# Patient Record
Sex: FEMALE | Race: WHITE | NOT HISPANIC OR LATINO | Employment: OTHER | URBAN - METROPOLITAN AREA
[De-identification: names, ages, dates, MRNs, and addresses within clinical notes are randomized per-mention and may not be internally consistent; named-entity substitution may affect disease eponyms.]

---

## 2023-11-03 ENCOUNTER — CONSULT (OUTPATIENT)
Dept: PULMONOLOGY | Facility: MEDICAL CENTER | Age: 88
End: 2023-11-03
Payer: MEDICARE

## 2023-11-03 VITALS
OXYGEN SATURATION: 97 % | SYSTOLIC BLOOD PRESSURE: 122 MMHG | WEIGHT: 179 LBS | BODY MASS INDEX: 30.56 KG/M2 | DIASTOLIC BLOOD PRESSURE: 70 MMHG | RESPIRATION RATE: 16 BRPM | HEIGHT: 64 IN | TEMPERATURE: 97.9 F | HEART RATE: 69 BPM

## 2023-11-03 DIAGNOSIS — J45.20 MILD INTERMITTENT ASTHMA WITHOUT COMPLICATION: Primary | ICD-10-CM

## 2023-11-03 DIAGNOSIS — M54.81 OCCIPITAL NEURALGIA OF LEFT SIDE: ICD-10-CM

## 2023-11-03 DIAGNOSIS — I10 PRIMARY HYPERTENSION: ICD-10-CM

## 2023-11-03 LAB
DME PARACHUTE DELIVERY DATE ACTUAL: NORMAL
DME PARACHUTE DELIVERY DATE REQUESTED: NORMAL
DME PARACHUTE ITEM DESCRIPTION: NORMAL
DME PARACHUTE ORDER STATUS: NORMAL
DME PARACHUTE SUPPLIER NAME: NORMAL
DME PARACHUTE SUPPLIER PHONE: NORMAL

## 2023-11-03 PROCEDURE — 99204 OFFICE O/P NEW MOD 45 MIN: CPT | Performed by: INTERNAL MEDICINE

## 2023-11-03 RX ORDER — RAMIPRIL 10 MG/1
CAPSULE ORAL
COMMUNITY

## 2023-11-03 RX ORDER — EZETIMIBE 10 MG/1
TABLET ORAL
COMMUNITY
Start: 2015-03-25

## 2023-11-03 RX ORDER — PANTOPRAZOLE SODIUM 40 MG/1
TABLET, DELAYED RELEASE ORAL
COMMUNITY

## 2023-11-03 RX ORDER — BETAMETHASONE DIPROPIONATE 0.5 MG/G
CREAM TOPICAL
COMMUNITY
Start: 2015-03-25

## 2023-11-03 RX ORDER — CEFDINIR 300 MG/1
CAPSULE ORAL
COMMUNITY
End: 2023-11-04

## 2023-11-03 RX ORDER — MULTIVIT-MIN/IRON/FOLIC ACID/K 18-600-40
CAPSULE ORAL
COMMUNITY

## 2023-11-03 RX ORDER — NABUMETONE 500 MG/1
TABLET, FILM COATED ORAL
COMMUNITY
Start: 2014-10-29

## 2023-11-03 RX ORDER — PREDNISONE 20 MG/1
TABLET ORAL
COMMUNITY

## 2023-11-03 RX ORDER — CYCLOSPORINE 0.5 MG/ML
EMULSION OPHTHALMIC
COMMUNITY
Start: 2014-11-20

## 2023-11-03 RX ORDER — ASPIRIN 81 MG/1
TABLET ORAL
COMMUNITY

## 2023-11-03 RX ORDER — MOMETASONE FUROATE 1 MG/G
CREAM TOPICAL DAILY
COMMUNITY

## 2023-11-03 RX ORDER — FLUTICASONE FUROATE AND VILANTEROL 100; 25 UG/1; UG/1
1 POWDER RESPIRATORY (INHALATION) DAILY
COMMUNITY

## 2023-11-03 RX ORDER — CYANOCOBALAMIN (VITAMIN B-12) 500 MCG
LOZENGE ORAL
COMMUNITY

## 2023-11-03 RX ORDER — CEFUROXIME AXETIL 250 MG/1
TABLET ORAL
COMMUNITY
End: 2023-11-04

## 2023-11-03 RX ORDER — ERGOCALCIFEROL (VITAMIN D2) 10 MCG
TABLET ORAL
COMMUNITY

## 2023-11-03 RX ORDER — FLUTICASONE PROPIONATE 50 MCG
SPRAY, SUSPENSION (ML) NASAL
COMMUNITY

## 2023-11-03 RX ORDER — METOPROLOL SUCCINATE 50 MG/1
TABLET, EXTENDED RELEASE ORAL
COMMUNITY
Start: 2008-05-15

## 2023-11-03 RX ORDER — LEVOTHYROXINE SODIUM 0.05 MG/1
TABLET ORAL
COMMUNITY

## 2023-11-03 NOTE — PATIENT INSTRUCTIONS
Start Breo 100 mcg 1 puff daily and rinse mouth with water after using    When you start having wheezing or shortness of breath or chest congestion can use nebulizer with 1 vial of albuterol-ipratropium 4 times a day as needed    Use BREO 100 mcg  - 1 puff daily for 1 month and see if it helps. If this helps you can stay on this medication.   Otherwise can try stopping it and only use it when you start to feel sick with your bronchitis    If you like the nebulizer and medication call me and I will send prescription for the medication for nebulizer to your pharmacy    Back telephone number   [phone]

## 2023-11-03 NOTE — PROGRESS NOTES
Assessment/Plan:       Problem List Items Addressed This Visit          Respiratory    Mild intermittent asthma without complication - Primary     Boni Sexton has history of asthma diagnosed about 10 years or more ago. She has not been on any maintenance steroid inhaler but has used albuterol inhaler. She does not have a nebulizer. She did have a faint expiratory wheeze in her left lung base. Spirometry showed evidence of mild airflow obstruction and there is mild restriction which could be due to air trapping. Give her a trial of Trelegy 100 mcg 1 puff daily to see if this helps her breathing. I did give her nebulizer treatment with DuoNeb and she starts some wheeze at the left base. I did order her a nebulizer and we able to dispense this from [employer] today. I did give her several vials of albuterol-ipratropium she can use up to 4 times a day as needed and her nebulizer. She is to use if she has any wheezing or shortness of breath. So she may want to use nebulizer when she gets her bout of bronchitis that she usually gets once a year    She does have some wheeze and some shortness of breath now. Also has some mild airflow obstruction. She has Breo 100 mcg 3 inhalers at home but has not been using. I did tell her to try using it for 1 month and then she can just save it for when she starts having difficulty breathing. We did contact [facility] and pulmonary department to get records from their second review prior spirometry that she may have had done before. Recent chest x-ray was done elsewhere October 14 reported only minimal linear scarring left lower lobe otherwise chest x-ray was unremarkable. Relevant Medications    Fluticasone Furoate-Vilanterol 100-25 mcg/actuation inhaler    Other Relevant Orders    Nebulizer       Cardiovascular and Mediastinum    Primary hypertension     Boni Sexton is more ramipril 10 mg daily and metoprolol 50 mg daily for hypertension.   Blood pressure today is normal.         Relevant Medications    metoprolol succinate (TOPROL-XL) 50 mg 24 hr tablet    ramipril (ALTACE) 10 MG capsule       Nervous and Auditory    Occipital neuralgia of left side     She does get frequent pain in left side of her neck and up to the occipital region of her skull. She does like to zohra frequently and neurologist are thought that this condition is related to her crocheting. Relevant Medications    nabumetone (RELAFEN) 500 mg tablet    metoprolol succinate (TOPROL-XL) 50 mg 24 hr tablet    aspirin (Aspirin 81) 81 mg EC tablet         Plan for follow up:    Return in about 2 months (around 1/3/2024). All questions are answered to the patient's satisfaction and understanding. She verbalizes understanding. She is encouraged to call with any further questions or concerns. Portions of the record may have been created with voice recognition software. Occasional wrong word or "sound a like" substitutions may have occurred due to the inherent limitations of voice recognition software. Read the chart carefully and recognize, using context, where substitutions have occurred. a    Electronically Signed by Solo Clements DO    ______________________________________________________________________    Chief Complaint: No chief complaint on file. Patient ID: Lucy Vang is a 82 y.o. y.o. female has history of asthma and hypertension    11/3/2023  Patient presents today for initial visit for asthma    HPI    She has some chronic exertional shortness of breath such as walking up a flight of stairs or if she carries some packages up some stairs. Not having any chronic cough. States she usually gets bronchitis once a year. Last time she was sick was in  of this year. Usually she gets her bouts of bronchitis in January. She used to follow with pulmonologist Dr. Painting Friend who has since . She is being treated for mild intermittent asthma.   She has used Breo 100 mcg 1 puff daily in past and this seemed to benefit her. She states she does have a 3-month supply of this which was prescribed to her but she has not been using. he does have history of hypertension, hypothyroidism, mitral valve prolapse and GERD. 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 No history of coronary artery disease. She does follow with cardiologist Dr. Joyce Damico. She is not having any chest pain. She denies any significant seasonal allergies. She states she does have macular degeneration. She often does get pain in left side of her neck and occipital region. This is felt to be secondary to left occipital neuralgia. She saw neurologist at [place] [facility] who made this diagnosis. She was told this by related to her pending her neck constantly while she does crocheting which she likes to do. Review of Systems   Constitutional:  Negative for chills, fever and unexpected weight change. HENT:  Negative for congestion, rhinorrhea and sore throat. Eyes:  Negative for discharge and redness. Respiratory:          Some shortness of breath with exertional activity. Not have any wheezing or cough. Cardiovascular:  Negative for chest pain, palpitations and leg swelling. Gastrointestinal:  Negative for abdominal distention, abdominal pain and nausea. Endocrine: Negative for polydipsia and polyphagia. Genitourinary:  Negative for dysuria. Musculoskeletal:  Negative for joint swelling and myalgias. Skin:  Negative for rash. Neurological:  Negative for light-headedness. Psychiatric/Behavioral:  Negative for confusion. Social history: She reports that she has never smoked.  She has never used smokeless tobacco.    Past surgical history:   Past Surgical History:   Procedure Laterality Date    CHOLECYSTECTOMY LAPAROSCOPIC      HYSTERECTOMY      LUMBAR SPINE SURGERY      luumbar spine surgery and fusion     Family history:   Family History   Problem Relation Age of Onset Diabetes Mother          There is no immunization history on file for this patient. Current Outpatient Medications   Medication Sig Dispense Refill    ascorbic Acid (VITAMIN C) 500 MG CPCR Take 500 mg by mouth daily      aspirin (Aspirin 81) 81 mg EC tablet Take by mouth      betamethasone, augmented, (DIPROLENE-AF) 0.05 % cream Apply topically      Cholecalciferol 25 MCG (1000 UT) capsule Take 1,000 Units by mouth daily      cycloSPORINE (Restasis) 0.05 % ophthalmic emulsion Apply to eye      ezetimibe (ZETIA) 10 mg tablet Oral for 90 Days      fluticasone (FLONASE) 50 mcg/act nasal spray Nasal for 30 Days      Fluticasone Furoate-Vilanterol 100-25 mcg/actuation inhaler Inhale 1 puff daily      levothyroxine 50 mcg tablet Oral for 90 Days      metoprolol succinate (TOPROL-XL) 50 mg 24 hr tablet Oral for 90 Days      mometasone (ELOCON) 0.1 % cream Apply topically daily      Multiple Vitamin (Daily Value Multivitamin) TABS Take by mouth      Multiple Vitamin (MULTIVITAMIN ADULT PO) Take 1 capsule by mouth daily      nabumetone (RELAFEN) 500 mg tablet Oral for 60 Days      pantoprazole (PROTONIX) 40 mg tablet Oral for 90 Days      ramipril (ALTACE) 10 MG capsule Oral for 90 Days      Vitamin D, Cholecalciferol, 25 MCG (1000 UT) TABS Take by mouth      Vitamin E 400 units TABS Take by mouth      predniSONE 20 mg tablet Oral for 7 Days (Patient not taking: Reported on 11/3/2023)       No current facility-administered medications for this visit. Allergies: Penicillins, Alatrofloxacin, Iodine - food allergy, Iodixanol, Irbesartan, Other, and Trovafloxacin    Objective:  Vitals:    11/03/23 1117   BP: 122/70   BP Location: Left arm   Patient Position: Sitting   Cuff Size: Standard   Pulse: 69   Resp: 16   Temp: 97.9 °F (36.6 °C)   TempSrc: Temporal   SpO2: 97%   Weight: 81.2 kg (179 lb)   Height: 5' 4" (1.626 m)   Oxygen Therapy  SpO2: 97 %  .   Wt Readings from Last 3 Encounters:   11/03/23 81.2 kg (179 lb) 05/05/15 81.6 kg (180 lb)   02/24/15 81.6 kg (180 lb)     Body mass index is 30.73 kg/m². Physical Exam  Vitals reviewed. Constitutional:       General: She is not in acute distress. Appearance: Normal appearance. She is well-developed. HENT:      Head: Normocephalic. Right Ear: External ear normal.      Left Ear: External ear normal.      Nose: Nose normal.      Mouth/Throat:      Pharynx: No oropharyngeal exudate. Eyes:      Conjunctiva/sclera: Conjunctivae normal.      Pupils: Pupils are equal, round, and reactive to light. Neck:      Vascular: No JVD. Trachea: No tracheal deviation. Cardiovascular:      Rate and Rhythm: Normal rate and regular rhythm. Heart sounds: Normal heart sounds. Pulmonary:      Effort: Pulmonary effort is normal.      Comments: There is expiratory wheeze left lung base. Otherwise lungs are clear. No crackles or rhonchi  Abdominal:      General: There is no distension. Palpations: Abdomen is soft. Tenderness: There is no abdominal tenderness. There is no guarding. Musculoskeletal:      Cervical back: Neck supple. Comments: Has +1 edema left lower tibial surface and trace to +1 edema right lower extremity surface. Has some varicosities of leg veins bilaterally   Lymphadenopathy:      Cervical: No cervical adenopathy. Skin:     General: Skin is warm and dry. Findings: No rash. Neurological:      General: No focal deficit present. Mental Status: She is alert and oriented to person, place, and time. Psychiatric:         Behavior: Behavior normal.         Thought Content: Thought content normal.             Diagnostics:  I have personally reviewed pertinent reports. Chest x-ray: Was done on 10/14/2023 at [facility]    This shows some minimal left lower lobe basilar linear scarring.   No other abnormalities      Office Spirometry Results:    FVC - 1.63 L    68%  FEV1 - 1.09 L   62%  FEV1/FVC% - 67%    Mild restrictive impairment and mild airflow obstruction.     Pulse oximetry at rest on room air was 97% and after ambulating up and down a flight of stairs her lowest O2 saturation was 97% with heart rate of 85

## 2023-11-04 PROBLEM — I10 PRIMARY HYPERTENSION: Status: ACTIVE | Noted: 2023-11-04

## 2023-11-04 PROBLEM — M54.81 OCCIPITAL NEURALGIA OF LEFT SIDE: Status: ACTIVE | Noted: 2023-11-04

## 2023-11-05 NOTE — ASSESSMENT & PLAN NOTE
She does get frequent pain in left side of her neck and up to the occipital region of her skull. She does like to zohra frequently and neurologist are thought that this condition is related to her crocheting.

## 2023-11-05 NOTE — ASSESSMENT & PLAN NOTE
Jil Just is more ramipril 10 mg daily and metoprolol 50 mg daily for hypertension.   Blood pressure today is normal.

## 2023-11-05 NOTE — ASSESSMENT & PLAN NOTE
Salty Escobar has history of asthma diagnosed about 10 years or more ago. She has not been on any maintenance steroid inhaler but has used albuterol inhaler. She does not have a nebulizer. She did have a faint expiratory wheeze in her left lung base. Spirometry showed evidence of mild airflow obstruction and there is mild restriction which could be due to air trapping. Give her a trial of Trelegy 100 mcg 1 puff daily to see if this helps her breathing. I did give her nebulizer treatment with DuoNeb and she starts some wheeze at the left base. I did order her a nebulizer and we able to dispense this from [employer] today. I did give her several vials of albuterol-ipratropium she can use up to 4 times a day as needed and her nebulizer. She is to use if she has any wheezing or shortness of breath. So she may want to use nebulizer when she gets her bout of bronchitis that she usually gets once a year    She does have some wheeze and some shortness of breath now. Also has some mild airflow obstruction. She has Breo 100 mcg 3 inhalers at home but has not been using. I did tell her to try using it for 1 month and then she can just save it for when she starts having difficulty breathing. We did contact [facility] and pulmonary department to get records from their second review prior spirometry that she may have had done before. Recent chest x-ray was done elsewhere October 14 reported only minimal linear scarring left lower lobe otherwise chest x-ray was unremarkable.

## 2024-04-10 NOTE — PATIENT INSTRUCTIONS
Leg swelling  Venous insufficiency    Order for prescription graded compression stockings of 20-30 mm Hg  Wear stocking EVERY day to help control swelling in legs and remove at night  Elevate legs while at rest  Continue healthy life-style changes; heart-healthy and low sodium diet  Activity as tolerated  Patient education for varicose veins  Follow up 1 month

## 2024-04-11 NOTE — PROGRESS NOTES
Assessment/Plan:    Venous insufficiency of both lower extremities  -     Compression Stocking    -L > R LE edema; L LE with flat erythema and risk for cellulitis  -Currently mildly symptomatic; no open wounds or evidence of infection  -Exam: Feet warm and well-perfused. Evidence of chronic venous disease, vv, flat erythema of the L ankle/LE    Order for prescription graded compression stockings of 20-30 mm Hg  Compression, periodic elevation, low-sodium diet, regular activity as tolerated   Wear stocking EVERY day to help control swelling in legs and remove at night  Patient education for varicose veins  Follow up after 1 month of compression for re-evaluation; could consider lymph pumps    Subjective:      Patient ID: Elvie Méndez is a 88 y.o. female.  New patient, presents today for evaluation of VV.     HPI   Ms. Elvie Méndez 88 yoF GERD, asthma, hypertension, hyperlipidemia, hyperglycemia, hypothyroidism who is referred by Dr. Rosales, cardiology for evaluation of varicose veins with edema.     Patient states that she has had longstanding varicose veins which does not bother her.  However in the past couple of years, she has developed left > R lower extremity leg edema with pain and heaviness in the legs.  She has intermittent redness at the left ankle with warmth which usually occurs toward the end of the day.  The left leg as been swelling for a couple of years and now the right leg is slowly starting to swell.  Patient saw her cardiologist who told her her heart was fine and not because of leg swelling.  We discussed that she should follow-up with PCP for metabolic and other potential causes of swelling and do not suspect she has element of venous insufficiency which we will start to treat conservatively.   She has chronic numbness in the toes.  Otherwise she reports for 88 she needs feels she has a lot of energy and is not held back.  She has no typical claudication.  She has no ischemic rest pain or  "tissue loss.    If she continues to have inadequately controlled edema, we can consider further compressive measures -  such as lymphedema pumps.  Baseline measurements were taken in the office today and she was fitted with Tubigrip.  We will reevaluate her in about 1 month.    Medical therapy includes aspirin 81, prednisone, etc.     The following portions of the patient's history were reviewed and updated as appropriate: allergies, current medications, past family history, past medical history, past social history, past surgical history, and problem list.    Review of Systems   Constitutional: Negative.    HENT: Negative.     Eyes: Negative.    Respiratory: Negative.     Cardiovascular:  Positive for leg swelling.   Gastrointestinal: Negative.    Endocrine: Negative.    Genitourinary: Negative.    Musculoskeletal: Negative.    Skin: Negative.    Allergic/Immunologic: Negative.    Neurological: Negative.    Hematological: Negative.    Psychiatric/Behavioral: Negative.         Objective:      /50 (BP Location: Left arm, Patient Position: Sitting, Cuff Size: Standard)   Pulse 65   Ht 5' 4\" (1.626 m)   Wt 80.7 kg (178 lb)   BMI 30.55 kg/m²       R LE mild LE edema  L LE 1+ ankle edema, flat erythema at the ankle, no heat.    Scattered varicosities and corona phlebectasia which are overall soft. One surface cluster at the distal lateral LLE        Measurements  Thigh  51 cm  51 cm  Calf   42 cm 44 cm  Ankle   25 cm 27 cm     Physical Exam  Vitals and nursing note reviewed.   Constitutional:       Appearance: She is well-developed.   HENT:      Head: Normocephalic and atraumatic.   Eyes:      Pupils: Pupils are equal, round, and reactive to light.   Neck:      Thyroid: No thyromegaly.      Vascular: No JVD.      Trachea: Trachea normal.   Cardiovascular:      Rate and Rhythm: Normal rate and regular rhythm.      Pulses:           Carotid pulses are 2+ on the right side and 2+ on the left side.       Radial " "pulses are 2+ on the right side and 2+ on the left side.        Dorsalis pedis pulses are 2+ on the right side and detected w/ Doppler on the left side.        Posterior tibial pulses are detected w/ Doppler on the right side and detected w/ Doppler on the left side.      Heart sounds: Normal heart sounds, S1 normal and S2 normal. No murmur heard.     No friction rub. No gallop.   Pulmonary:      Effort: Pulmonary effort is normal. No accessory muscle usage or respiratory distress.      Breath sounds: Normal breath sounds. No wheezing or rales.   Abdominal:      General: Bowel sounds are normal. There is no distension.      Palpations: Abdomen is soft.      Tenderness: There is no abdominal tenderness.   Musculoskeletal:         General: No deformity. Normal range of motion.      Cervical back: Neck supple.      Right lower le+ Edema present.      Left lower le+ Edema present.   Skin:     General: Skin is warm and dry.      Findings: No lesion or rash.      Nails: There is no clubbing.   Neurological:      Mental Status: She is alert and oriented to person, place, and time.      Comments: Grossly normal    Psychiatric:         Behavior: Behavior is cooperative.         I have reviewed and made appropriate changes to the review of systems input by the medical assistant.    Vitals:    24 1107   BP: 138/50   BP Location: Left arm   Patient Position: Sitting   Cuff Size: Standard   Pulse: 65   Weight: 80.7 kg (178 lb)   Height: 5' 4\" (1.626 m)       Patient Active Problem List   Diagnosis    Mild intermittent asthma without complication    Primary hypertension    Occipital neuralgia of left side       Past Surgical History:   Procedure Laterality Date    CHOLECYSTECTOMY LAPAROSCOPIC      HYSTERECTOMY      LUMBAR SPINE SURGERY      luumbar spine surgery and fusion       Family History   Problem Relation Age of Onset    Diabetes Mother        Social History     Socioeconomic History    Marital status: "      Spouse name: Not on file    Number of children: Not on file    Years of education: Not on file    Highest education level: Not on file   Occupational History    Not on file   Tobacco Use    Smoking status: Never    Smokeless tobacco: Never   Substance and Sexual Activity    Alcohol use: Not on file    Drug use: Not on file    Sexual activity: Not on file   Other Topics Concern    Not on file   Social History Narrative    Not on file     Social Determinants of Health     Financial Resource Strain: Not on file   Food Insecurity: Not on file   Transportation Needs: Not on file   Physical Activity: Not on file   Stress: Not on file   Social Connections: Not on file   Intimate Partner Violence: Not on file   Housing Stability: Not on file       Allergies   Allergen Reactions    Penicillins Edema    Alatrofloxacin Swelling    Iodine - Food Allergy Other (See Comments)    Iodixanol Other (See Comments)    Irbesartan Other (See Comments)     Heart palpitations and dyspnea    Other Other (See Comments)    Trovafloxacin Swelling     Reaction Date: 15May2008;         Current Outpatient Medications:     ascorbic Acid (VITAMIN C) 500 MG CPCR, Take 500 mg by mouth daily, Disp: , Rfl:     betamethasone, augmented, (DIPROLENE-AF) 0.05 % cream, Apply topically, Disp: , Rfl:     Cholecalciferol 25 MCG (1000 UT) capsule, Take 1,000 Units by mouth daily, Disp: , Rfl:     cycloSPORINE (Restasis) 0.05 % ophthalmic emulsion, Apply to eye, Disp: , Rfl:     ezetimibe (ZETIA) 10 mg tablet, Oral for 90 Days, Disp: , Rfl:     fluticasone (FLONASE) 50 mcg/act nasal spray, Nasal for 30 Days, Disp: , Rfl:     Fluticasone Furoate-Vilanterol 100-25 mcg/actuation inhaler, Inhale 1 puff daily, Disp: , Rfl:     levothyroxine 50 mcg tablet, Oral for 90 Days, Disp: , Rfl:     metoprolol succinate (TOPROL-XL) 50 mg 24 hr tablet, Oral for 90 Days, Disp: , Rfl:     mometasone (ELOCON) 0.1 % cream, Apply topically daily, Disp: , Rfl:      Multiple Vitamin (Daily Value Multivitamin) TABS, Take by mouth, Disp: , Rfl:     Multiple Vitamin (MULTIVITAMIN ADULT PO), Take 1 capsule by mouth daily, Disp: , Rfl:     nabumetone (RELAFEN) 500 mg tablet, Oral for 60 Days, Disp: , Rfl:     pantoprazole (PROTONIX) 40 mg tablet, Oral for 90 Days, Disp: , Rfl:     ramipril (ALTACE) 10 MG capsule, Oral for 90 Days, Disp: , Rfl:     Vitamin D, Cholecalciferol, 25 MCG (1000 UT) TABS, Take by mouth, Disp: , Rfl:     Vitamin E 400 units TABS, Take by mouth, Disp: , Rfl:     aspirin (Aspirin 81) 81 mg EC tablet, Take by mouth (Patient not taking: Reported on 4/12/2024), Disp: , Rfl:     predniSONE 20 mg tablet, Oral for 7 Days (Patient not taking: Reported on 11/3/2023), Disp: , Rfl:

## 2024-04-12 ENCOUNTER — OFFICE VISIT (OUTPATIENT)
Dept: VASCULAR SURGERY | Facility: CLINIC | Age: 89
End: 2024-04-12

## 2024-04-12 VITALS
SYSTOLIC BLOOD PRESSURE: 138 MMHG | HEIGHT: 64 IN | BODY MASS INDEX: 30.39 KG/M2 | DIASTOLIC BLOOD PRESSURE: 50 MMHG | HEART RATE: 65 BPM | WEIGHT: 178 LBS

## 2024-04-12 DIAGNOSIS — I87.2 VENOUS INSUFFICIENCY OF BOTH LOWER EXTREMITIES: Primary | ICD-10-CM

## 2024-05-29 NOTE — PROGRESS NOTES
Assessment/Plan:     Venous insufficiency   Secondary lymphedema    -Bilat vv with leg heaviness and L > R LE edema; L LE with flat erythema and risk for cellulitis  -Continued pain, edema and erythema compliant with compression    Plan:  -Patient returns for re-check of leg swelling with partial improvement with compression stockings but concerned that her legs are still swelling as the day goes on and the L shin becomes red; no overt infections but concern that she may be at risk; given age, agree with optimizing conservative measures with compression over surgical intervention at this time  -Add lymphedema pumps for 45 minutes twice daily and titrate (initial measurements 4/12/24, second 5/31/24)  -Continue with compression stockings to be worn daily and removed at night  -Compression, periodic elevation, low Na, skin moisturizer to legs daily  -Patient education regarding venous insufficiency  -Follow up 3 months or sooner for increased symptoms or evidence of infection       Diagnoses and all orders for this visit:    Venous insufficiency of both lower extremities  -     Compression Stocking  -     Assistive Device (stocking application)  -     Pneumatic compression pumps  -     Cancel: Pneumatic compression pumps    Secondary lymphedema  -     Assistive Device (stocking application)  -     Pneumatic compression pumps  -     Cancel: Pneumatic compression pumps        Subjective:     Patient ID: Elvie Méndez is a 88 y.o. female.  Patient presents today for f/u visit to reassess b/l leg swelling. Has been compliant with compression, states that it has helped a bit with leg swelling.     HPI   Ms. Elvie Méndez 88 yoF GERD, asthma, hypertension, hyperlipidemia, hyperglycemia, hypothyroidism who is referred by Dr. Rosales, cardiology for evaluation of varicose veins with edema.      Consult 4/12/24: Patient states that she has had longstanding varicose veins which do not specifically bother her.  However in  "the past couple of years, she has developed left > R lower extremity leg edema with pain and heaviness in the legs.  She has intermittent redness at the left ankle with warmth which usually occurs toward the end of the day.  The left leg has been swelling for a couple of years and now the right leg is slowly starting to swell.  Patient saw her cardiologist who told her her heart was fine and not cause of leg swelling.  We discussed that she should follow-up with PCP for metabolic and other potential contributing causes of swelling, but also suspect an element of venous insufficiency which we will start to treat conservatively.   She has chronic numbness in the toes.  Otherwise she reports for 88 she needs feels she has a lot of energy and is not held back.  She has no typical claudication.  She has no ischemic rest pain or tissue loss.     If she continues to have inadequately controlled edema, we can consider further compressive measures -  such as lymphedema pumps.  Baseline measurements were taken in the office today and she was fitted with Tubigrip.  We will reevaluate her in about 1 month.     Medical therapy includes aspirin 81, prednisone, etc.        5/31/24:  Ms. Méndez returns for vascular follow up. She brings in several pairs of compression stockings for us to look at. She appears to have OTC compression and wearing Dr. Stern. She reports that he OTC stockings are difficulty to get on but she is complaint and wears them. The legs feel partially, \"70%\" improved but she she has leg swelling and by the end of the day, the L shin is red. She has had no heat or infection, but with ongoing leg swelling and intermittent erythema, we discussed that should could be at risk for infection/ cellulitis.     She should trial medical compression stockings - Rx given. Also, discussed additonial conservative measures to held with edema.     Patient shows me photos of OTC below knee pneumatic devices available on [employer].  " With ongoing symptoms and patient's request for additional treatment, recommend that he order medical compression. She is agreeable to daily treatment with compression pumps.     Follow up in about 3 months, but she is instructed to return sooner for worsening symptoms or signs of infection.       Review of Systems   Constitutional: Negative.    HENT: Negative.     Eyes: Negative.    Respiratory: Negative.     Cardiovascular:  Positive for leg swelling.   Gastrointestinal: Negative.    Endocrine: Negative.    Genitourinary: Negative.    Musculoskeletal: Negative.    Skin: Negative.    Allergic/Immunologic: Negative.    Neurological: Negative.    Hematological: Negative.    Psychiatric/Behavioral: Negative.           Objective:    1-2+ B LE edema. Feet warm. 1+ DP pulses    + Diffuse varicose veins             Physical Exam  Vitals and nursing note reviewed.   Constitutional:       Appearance: She is well-developed.   HENT:      Head: Normocephalic and atraumatic.   Eyes:      Extraocular Movements: EOM normal.      Pupils: Pupils are equal, round, and reactive to light.   Neck:      Thyroid: No thyromegaly.      Vascular: No JVD.      Trachea: Trachea normal.   Cardiovascular:      Rate and Rhythm: Normal rate and regular rhythm.      Pulses:           Radial pulses are 2+ on the right side and 2+ on the left side.        Dorsalis pedis pulses are 1+ on the right side and 1+ on the left side.      Heart sounds: Normal heart sounds, S1 normal and S2 normal. No murmur heard.     No friction rub. No gallop.   Pulmonary:      Effort: Pulmonary effort is normal. No accessory muscle usage or respiratory distress.      Breath sounds: Normal breath sounds. No wheezing or rales.   Abdominal:      General: Bowel sounds are normal. There is no distension.      Palpations: Abdomen is soft.      Tenderness: There is no abdominal tenderness.   Musculoskeletal:         General: No deformity. Normal range of motion.      Cervical  "back: Neck supple.      Right lower le+ Edema present.      Left lower le+ Edema present.   Skin:     General: Skin is warm and dry.      Findings: No lesion or rash.      Nails: There is no clubbing.   Neurological:      Mental Status: She is alert and oriented to person, place, and time.      Comments: Grossly normal    Psychiatric:         Mood and Affect: Mood and affect normal.         Behavior: Behavior is cooperative.           [employer]   Kem Pinedo   Phone: [phone]  Fax: [phone]   E-mail: [email or web address]    Ordering Provider:  Beronica Bennett (NPI: [id number])  [facility] [place] [address]  Phone: [phone]  Fax: [phone]      Patient Information  MRN: [id number]   Elvie Méndez  1935  [address] [place] [id number]  [phone]     Insurance Information  Payor: MEDICARE / Plan: MEDICARE A AND B / Product Type: Medicare A & B Fee for Service /      [id number] - (Medicare )     Patient Height and Weight 5' 4\" (1.626 m)    Wt Readings from Last 1 Encounters:   24 79.8 kg (176 lb)     \    Patient History and Prognosis:  [x] Patient was diagnosed for the chronic disorder with reported on-set __  [x] Attempts at elevation and compression have not improved patients' condition and is now at risk of lymphatic disorder progressing to the next stage/ grade  [x] Patient's physical condition/ range of motion limited for exercise  [x] Patient/ Caregiver experienced difficulty applying 30 mmHg distal compression garments  [x] Patient compression stocking/ wrap tolerance limited due to pain/ reduced circulation  [x] Patient advised to reduce salt intake and adhere to a daily regiment of elevation, compression, and lymphatic exercises  [x] Patient's severe condition will not improve without further treatment interventions  [x] Patient has noted skin changes such as marked " "hyperkeratosis with hyperplasia and hyperpigmentation, papillomatosis cutis lymphostatica, elephantiasis, and/ or skin breakdown with persisting lymphorrhea    Symptoms/ Observation/ Evaluation/ Plan of Care for Lymphedema / Venous Compression Pumps     Conservative Care ? 4 weeks for severe lymphedema (check all that apply):  Patient instructions for DAILY use of conservative therapies;  [x] Elevate extremities daily and nightly to reduce swelling  [x] Exercise and ambulate / range of motion (ROM) daily to increase fluid flow and reduce swelling  [x] Wear 30-mmHg distal compression garments / wraps daily to reduce / control swelling  [x] Manual lymph drainage (MLD)  [x] On-set date of lymphedema / ulcers: at least 2019          Post 4-week Measurements      Body Part Right Left   Ankle / Forearm 25 cm 25.5 cm   Calf / Elbow  41 cm 42 cm   Knee / Bicep  - -   Mid-Thigh / Axilla  52.5 cm 54.5 cm     Patient outcome after 4-weeks of conservative therapy:   [x] Patient's condition has NOT improved        I have reviewed and made appropriate changes to the review of systems input by the medical assistant.    Vitals:    05/31/24 1100   BP: (!) 174/80   BP Location: Left arm   Patient Position: Sitting   Pulse: 66   Weight: 79.8 kg (176 lb)   Height: 5' 4\" (1.626 m)       Patient Active Problem List   Diagnosis    Mild intermittent asthma without complication    Primary hypertension    Occipital neuralgia of left side    Venous insufficiency of both lower extremities    Secondary lymphedema       Past Surgical History:   Procedure Laterality Date    CHOLECYSTECTOMY LAPAROSCOPIC      HYSTERECTOMY      LUMBAR SPINE SURGERY      luumbar spine surgery and fusion       Family History   Problem Relation Age of Onset    Diabetes Mother        Social History     Socioeconomic History    Marital status:      Spouse name: Not on file    Number of children: Not on file    Years of education: Not on file    Highest education " level: Not on file   Occupational History    Not on file   Tobacco Use    Smoking status: Never    Smokeless tobacco: Never   Vaping Use    Vaping status: Unknown   Substance and Sexual Activity    Alcohol use: Not on file    Drug use: Not on file    Sexual activity: Not on file   Other Topics Concern    Not on file   Social History Narrative    Not on file     Social Determinants of Health     Financial Resource Strain: Not on file   Food Insecurity: Not on file   Transportation Needs: Not on file   Physical Activity: Not on file   Stress: Not on file   Social Connections: Not on file   Intimate Partner Violence: Not on file   Housing Stability: Not on file       Allergies   Allergen Reactions    Penicillins Edema    Alatrofloxacin Swelling    Iodine - Food Allergy Other (See Comments)    Iodixanol Other (See Comments)    Irbesartan Other (See Comments)     Heart palpitations and dyspnea    Other Other (See Comments)    Trovafloxacin Swelling     Reaction Date: 15May2008;         Current Outpatient Medications:     ascorbic Acid (VITAMIN C) 500 MG CPCR, Take 500 mg by mouth daily, Disp: , Rfl:     betamethasone, augmented, (DIPROLENE-AF) 0.05 % cream, Apply topically, Disp: , Rfl:     Cholecalciferol 25 MCG (1000 UT) capsule, Take 1,000 Units by mouth daily, Disp: , Rfl:     cycloSPORINE (Restasis) 0.05 % ophthalmic emulsion, Apply to eye, Disp: , Rfl:     ezetimibe (ZETIA) 10 mg tablet, Oral for 90 Days, Disp: , Rfl:     fluticasone (FLONASE) 50 mcg/act nasal spray, Nasal for 30 Days, Disp: , Rfl:     Fluticasone Furoate-Vilanterol 100-25 mcg/actuation inhaler, Inhale 1 puff daily, Disp: , Rfl:     levothyroxine 50 mcg tablet, Oral for 90 Days, Disp: , Rfl:     metoprolol succinate (TOPROL-XL) 50 mg 24 hr tablet, Oral for 90 Days, Disp: , Rfl:     mometasone (ELOCON) 0.1 % cream, Apply topically daily, Disp: , Rfl:     Multiple Vitamin (Daily Value Multivitamin) TABS, Take by mouth, Disp: , Rfl:     Multiple  Vitamin (MULTIVITAMIN ADULT PO), Take 1 capsule by mouth daily, Disp: , Rfl:     nabumetone (RELAFEN) 500 mg tablet, Oral for 60 Days, Disp: , Rfl:     pantoprazole (PROTONIX) 40 mg tablet, Oral for 90 Days, Disp: , Rfl:     ramipril (ALTACE) 10 MG capsule, Oral for 90 Days, Disp: , Rfl:     Vitamin D, Cholecalciferol, 25 MCG (1000 UT) TABS, Take by mouth, Disp: , Rfl:     Vitamin E 400 units TABS, Take by mouth, Disp: , Rfl:     aspirin (Aspirin 81) 81 mg EC tablet, Take by mouth (Patient not taking: Reported on 4/12/2024), Disp: , Rfl:     predniSONE 20 mg tablet, Oral for 7 Days (Patient not taking: Reported on 11/3/2023), Disp: , Rfl:

## 2024-05-31 ENCOUNTER — OFFICE VISIT (OUTPATIENT)
Dept: VASCULAR SURGERY | Facility: CLINIC | Age: 89
End: 2024-05-31
Payer: MEDICARE

## 2024-05-31 VITALS
DIASTOLIC BLOOD PRESSURE: 80 MMHG | BODY MASS INDEX: 30.05 KG/M2 | HEIGHT: 64 IN | WEIGHT: 176 LBS | HEART RATE: 66 BPM | SYSTOLIC BLOOD PRESSURE: 174 MMHG

## 2024-05-31 DIAGNOSIS — I89.0 SECONDARY LYMPHEDEMA: ICD-10-CM

## 2024-05-31 DIAGNOSIS — I87.2 VENOUS INSUFFICIENCY OF BOTH LOWER EXTREMITIES: Primary | ICD-10-CM

## 2024-05-31 PROCEDURE — 99214 OFFICE O/P EST MOD 30 MIN: CPT | Performed by: PHYSICIAN ASSISTANT

## 2024-05-31 NOTE — LETTER
June 2, 2024     Angi Alcocer MD  [address]  [place] [address]  [address]  [facility] [address]    Patient: Elvie Méndez   YOB: 1935   Date of Visit: 5/31/2024       Dear Dr. Alcocer:    Thank you for referring Elvie Méndez to me for evaluation. Below are my notes for this consultation.    If you have questions, please do not hesitate to call me. I look forward to following your patient along with you.         Sincerely,        Beronica Hussein PA-C        CC: [facility]    Beronica Hussein PA-C  6/2/2024  8:50 PM  Sign when Signing Visit  Assessment/Plan:     Venous insufficiency   Secondary lymphedema    -Bilat vv with leg heaviness and L > R LE edema; L LE with flat erythema and risk for cellulitis  -Continued pain, edema and erythema compliant with compression    Plan:  -Patient returns for re-check of leg swelling with partial improvement with compression stockings but concerned that her legs are still swelling as the day goes on and the L shin becomes red; no overt infections but concern that she may be at risk; given age, agree with optimizing conservative measures with compression over surgical intervention at this time  -Add lymphedema pumps for 45 minutes twice daily and titrate (initial measurements 4/12/24, second 5/31/24)  -Continue with compression stockings to be worn daily and removed at night  -Compression, periodic elevation, low Na, skin moisturizer to legs daily  -Patient education regarding venous insufficiency  -Follow up 3 months or sooner for increased symptoms or evidence of infection       Diagnoses and all orders for this visit:    Venous insufficiency of both lower extremities  -     Compression Stocking  -     Assistive Device (stocking application)  -     Pneumatic compression pumps  -     Cancel: Pneumatic compression pumps    Secondary lymphedema  -     Assistive Device (stocking application)  -      Pneumatic compression pumps  -     Cancel: Pneumatic compression pumps        Subjective:     Patient ID: Elvie Mnédez is a 88 y.o. female.  Patient presents today for f/u visit to reassess b/l leg swelling. Has been compliant with compression, states that it has helped a bit with leg swelling.     HPI   Ms. Elvie Méndez 88 yoF GERD, asthma, hypertension, hyperlipidemia, hyperglycemia, hypothyroidism who is referred by Dr. Rosales, cardiology for evaluation of varicose veins with edema.      Consult 4/12/24: Patient states that she has had longstanding varicose veins which do not specifically bother her.  However in the past couple of years, she has developed left > R lower extremity leg edema with pain and heaviness in the legs.  She has intermittent redness at the left ankle with warmth which usually occurs toward the end of the day.  The left leg has been swelling for a couple of years and now the right leg is slowly starting to swell.  Patient saw her cardiologist who told her her heart was fine and not cause of leg swelling.  We discussed that she should follow-up with PCP for metabolic and other potential contributing causes of swelling, but also suspect an element of venous insufficiency which we will start to treat conservatively.   She has chronic numbness in the toes.  Otherwise she reports for 88 she needs feels she has a lot of energy and is not held back.  She has no typical claudication.  She has no ischemic rest pain or tissue loss.     If she continues to have inadequately controlled edema, we can consider further compressive measures -  such as lymphedema pumps.  Baseline measurements were taken in the office today and she was fitted with Tubigrip.  We will reevaluate her in about 1 month.     Medical therapy includes aspirin 81, prednisone, etc.        5/31/24:  Ms. Méndez returns for vascular follow up. She brings in several pairs of compression stockings for us to look at. She appears to  "have OTC compression and wearing Dr. Stern. She reports that he OTC stockings are difficulty to get on but she is complaint and wears them. The legs feel partially, \"70%\" improved but she she has leg swelling and by the end of the day, the L shin is red. She has had no heat or infection, but with ongoing leg swelling and intermittent erythema, we discussed that should could be at risk for infection/ cellulitis.     She should trial medical compression stockings - Rx given. Also, discussed additonial conservative measures to held with edema.     Patient shows me photos of OTC below knee pneumatic devices available on [employer].  With ongoing symptoms and patient's request for additional treatment, recommend that he order medical compression. She is agreeable to daily treatment with compression pumps.     Follow up in about 3 months, but she is instructed to return sooner for worsening symptoms or signs of infection.       Review of Systems   Constitutional: Negative.    HENT: Negative.     Eyes: Negative.    Respiratory: Negative.     Cardiovascular:  Positive for leg swelling.   Gastrointestinal: Negative.    Endocrine: Negative.    Genitourinary: Negative.    Musculoskeletal: Negative.    Skin: Negative.    Allergic/Immunologic: Negative.    Neurological: Negative.    Hematological: Negative.    Psychiatric/Behavioral: Negative.           Objective:    1-2+ B LE edema. Feet warm. 1+ DP pulses    + Diffuse varicose veins             Physical Exam  Vitals and nursing note reviewed.   Constitutional:       Appearance: She is well-developed.   HENT:      Head: Normocephalic and atraumatic.   Eyes:      Extraocular Movements: EOM normal.      Pupils: Pupils are equal, round, and reactive to light.   Neck:      Thyroid: No thyromegaly.      Vascular: No JVD.      Trachea: Trachea normal.   Cardiovascular:      Rate and Rhythm: Normal rate and regular rhythm.      Pulses:           Radial pulses are 2+ on the right side and " "2+ on the left side.        Dorsalis pedis pulses are 1+ on the right side and 1+ on the left side.      Heart sounds: Normal heart sounds, S1 normal and S2 normal. No murmur heard.     No friction rub. No gallop.   Pulmonary:      Effort: Pulmonary effort is normal. No accessory muscle usage or respiratory distress.      Breath sounds: Normal breath sounds. No wheezing or rales.   Abdominal:      General: Bowel sounds are normal. There is no distension.      Palpations: Abdomen is soft.      Tenderness: There is no abdominal tenderness.   Musculoskeletal:         General: No deformity. Normal range of motion.      Cervical back: Neck supple.      Right lower le+ Edema present.      Left lower le+ Edema present.   Skin:     General: Skin is warm and dry.      Findings: No lesion or rash.      Nails: There is no clubbing.   Neurological:      Mental Status: She is alert and oriented to person, place, and time.      Comments: Grossly normal    Psychiatric:         Mood and Affect: Mood and affect normal.         Behavior: Behavior is cooperative.           [facility]   Kem Pinedo   Phone: [phone]  Fax: [phone]   E-mail: [email or web address]    Ordering Provider:  Beronica Bennett (NPI: [id number])  [facility]  [address] [place] [address]  Phone: [phone]  Fax: [phone]      Patient Information  MRN: [id number]   Elvie Méndez  1935  [address] [place] [id number]  [phone]     Insurance Information  Payor: MEDICARE / Plan: MEDICARE A AND B / Product Type: Medicare A & B Fee for Service /      [id number] - (Medicare )     Patient Height and Weight 5' 4\" (1.626 m)    Wt Readings from Last 1 Encounters:   24 79.8 kg (176 lb)     \    Patient History and Prognosis:  [x] Patient was diagnosed for the chronic disorder with reported on-set __  [x] Attempts at elevation and compression have " "not improved patients' condition and is now at risk of lymphatic disorder progressing to the next stage/ grade  [x] Patient's physical condition/ range of motion limited for exercise  [x] Patient/ Caregiver experienced difficulty applying 30 mmHg distal compression garments  [x] Patient compression stocking/ wrap tolerance limited due to pain/ reduced circulation  [x] Patient advised to reduce salt intake and adhere to a daily regiment of elevation, compression, and lymphatic exercises  [x] Patient's severe condition will not improve without further treatment interventions  [x] Patient has noted skin changes such as marked hyperkeratosis with hyperplasia and hyperpigmentation, papillomatosis cutis lymphostatica, elephantiasis, and/ or skin breakdown with persisting lymphorrhea    Symptoms/ Observation/ Evaluation/ Plan of Care for Lymphedema / Venous Compression Pumps     Conservative Care = 4 weeks for severe lymphedema (check all that apply):  Patient instructions for DAILY use of conservative therapies;  [x] Elevate extremities daily and nightly to reduce swelling  [x] Exercise and ambulate / range of motion (ROM) daily to increase fluid flow and reduce swelling  [x] Wear 30-mmHg distal compression garments / wraps daily to reduce / control swelling  [x] Manual lymph drainage (MLD)  [x] On-set date of lymphedema / ulcers: at least 2019          Post 4-week Measurements      Body Part Right Left   Ankle / Forearm 25 cm 25.5 cm   Calf / Elbow  41 cm 42 cm   Knee / Bicep  - -   Mid-Thigh / Axilla  52.5 cm 54.5 cm     Patient outcome after 4-weeks of conservative therapy:   [x] Patient's condition has NOT improved        I have reviewed and made appropriate changes to the review of systems input by the medical assistant.    Vitals:    05/31/24 1100   BP: (!) 174/80   BP Location: Left arm   Patient Position: Sitting   Pulse: 66   Weight: 79.8 kg (176 lb)   Height: 5' 4\" (1.626 m)       Patient Active Problem List "   Diagnosis   • Mild intermittent asthma without complication   • Primary hypertension   • Occipital neuralgia of left side   • Venous insufficiency of both lower extremities   • Secondary lymphedema       Past Surgical History:   Procedure Laterality Date   • CHOLECYSTECTOMY LAPAROSCOPIC     • HYSTERECTOMY     • LUMBAR SPINE SURGERY      luumbar spine surgery and fusion       Family History   Problem Relation Age of Onset   • Diabetes Mother        Social History     Socioeconomic History   • Marital status:      Spouse name: Not on file   • Number of children: Not on file   • Years of education: Not on file   • Highest education level: Not on file   Occupational History   • Not on file   Tobacco Use   • Smoking status: Never   • Smokeless tobacco: Never   Vaping Use   • Vaping status: Unknown   Substance and Sexual Activity   • Alcohol use: Not on file   • Drug use: Not on file   • Sexual activity: Not on file   Other Topics Concern   • Not on file   Social History Narrative   • Not on file     Social Determinants of Health     Financial Resource Strain: Not on file   Food Insecurity: Not on file   Transportation Needs: Not on file   Physical Activity: Not on file   Stress: Not on file   Social Connections: Not on file   Intimate Partner Violence: Not on file   Housing Stability: Not on file       Allergies   Allergen Reactions   • Penicillins Edema   • Alatrofloxacin Swelling   • Iodine - Food Allergy Other (See Comments)   • Iodixanol Other (See Comments)   • Irbesartan Other (See Comments)     Heart palpitations and dyspnea   • Other Other (See Comments)   • Trovafloxacin Swelling     Reaction Date: 15May2008;         Current Outpatient Medications:   •  ascorbic Acid (VITAMIN C) 500 MG CPCR, Take 500 mg by mouth daily, Disp: , Rfl:   •  betamethasone, augmented, (DIPROLENE-AF) 0.05 % cream, Apply topically, Disp: , Rfl:   •  Cholecalciferol 25 MCG (1000 UT) capsule, Take 1,000 Units by mouth daily,  Disp: , Rfl:   •  cycloSPORINE (Restasis) 0.05 % ophthalmic emulsion, Apply to eye, Disp: , Rfl:   •  ezetimibe (ZETIA) 10 mg tablet, Oral for 90 Days, Disp: , Rfl:   •  fluticasone (FLONASE) 50 mcg/act nasal spray, Nasal for 30 Days, Disp: , Rfl:   •  Fluticasone Furoate-Vilanterol 100-25 mcg/actuation inhaler, Inhale 1 puff daily, Disp: , Rfl:   •  levothyroxine 50 mcg tablet, Oral for 90 Days, Disp: , Rfl:   •  metoprolol succinate (TOPROL-XL) 50 mg 24 hr tablet, Oral for 90 Days, Disp: , Rfl:   •  mometasone (ELOCON) 0.1 % cream, Apply topically daily, Disp: , Rfl:   •  Multiple Vitamin (Daily Value Multivitamin) TABS, Take by mouth, Disp: , Rfl:   •  Multiple Vitamin (MULTIVITAMIN ADULT PO), Take 1 capsule by mouth daily, Disp: , Rfl:   •  nabumetone (RELAFEN) 500 mg tablet, Oral for 60 Days, Disp: , Rfl:   •  pantoprazole (PROTONIX) 40 mg tablet, Oral for 90 Days, Disp: , Rfl:   •  ramipril (ALTACE) 10 MG capsule, Oral for 90 Days, Disp: , Rfl:   •  Vitamin D, Cholecalciferol, 25 MCG (1000 UT) TABS, Take by mouth, Disp: , Rfl:   •  Vitamin E 400 units TABS, Take by mouth, Disp: , Rfl:   •  aspirin (Aspirin 81) 81 mg EC tablet, Take by mouth (Patient not taking: Reported on 4/12/2024), Disp: , Rfl:   •  predniSONE 20 mg tablet, Oral for 7 Days (Patient not taking: Reported on 11/3/2023), Disp: , Rfl:

## 2024-08-30 ENCOUNTER — OFFICE VISIT (OUTPATIENT)
Dept: VASCULAR SURGERY | Facility: CLINIC | Age: 89
End: 2024-08-30
Payer: MEDICARE

## 2024-08-30 VITALS
HEART RATE: 74 BPM | SYSTOLIC BLOOD PRESSURE: 118 MMHG | BODY MASS INDEX: 28.68 KG/M2 | DIASTOLIC BLOOD PRESSURE: 64 MMHG | WEIGHT: 168 LBS | HEIGHT: 64 IN

## 2024-08-30 DIAGNOSIS — I87.2 VENOUS INSUFFICIENCY OF BOTH LOWER EXTREMITIES: Primary | ICD-10-CM

## 2024-08-30 DIAGNOSIS — I89.0 SECONDARY LYMPHEDEMA: ICD-10-CM

## 2024-08-30 PROCEDURE — 99213 OFFICE O/P EST LOW 20 MIN: CPT | Performed by: PHYSICIAN ASSISTANT

## 2024-08-30 NOTE — ASSESSMENT & PLAN NOTE
-Follow up for varicose veins with pain and edema  -Wears compression but had difficulty getting the medical compression stockings on the leg  -Requested lymphedema pumps  -No interested in surgery    -Exam:    Mild, non-pitting edema. Mild chronic pink discoloration at the L ankle. No infection.     Thighs with 2-3 mm varicosities. B L >R varicose veins up to 5-8 mm    Plan:  -Stable leg swelling. No decompensated edema or infection.  -Detailed discussion regarding treatment for leg edema.   -Continue with compression stockings to be worn daily and removed at night  -Add lymphedema pumps for 45 minutes twice daily and titrate (initial measurements 4/12/24, second 5/31/24)  -Compression, periodic elevation, low Na, skin moisturizer to legs daily  -Patient education regarding venous insufficiency  -Follow up 6 months or sooner, if increased leg pain, swelling or other concerns.

## 2024-08-30 NOTE — PROGRESS NOTES
Ambulatory Visit  Name: Elvie Méndez      : 1935      MRN: [id number]  Encounter Provider: Beronica Hussein PA-C  Encounter Date: 2024   Enc[facility]: [facility]    Patient presents for eval of edema L>R.     Assessment & Plan   1. Venous insufficiency of both lower extremities  Assessment & Plan:  -Follow up for varicose veins with pain and edema  -Wears compression but had difficulty getting the medical compression stockings on the leg  -Requested lymphedema pumps  -No interested in surgery    -Exam:    Mild, non-pitting edema. Mild chronic pink discoloration at the L ankle. No infection.     Thighs with 2-3 mm varicosities. B L >R varicose veins up to 5-8 mm    Plan:  -Stable leg swelling. No decompensated edema or infection.  -Detailed discussion regarding treatment for leg edema.   -Continue with compression stockings to be worn daily and removed at night  -Add lymphedema pumps for 45 minutes twice daily and titrate (initial measurements 24, second 24)  -Compression, periodic elevation, low Na, skin moisturizer to legs daily  -Patient education regarding venous insufficiency  -Follow up 6 months or sooner, if increased leg pain, swelling or other concerns.   Orders:  -     Compression Stocking  -     Assistive Device (stocking application)  2. Secondary lymphedema      History of Present Illness     Elvie Méndez is a 88 y.o. female GERD, asthma, hypertension, hyperlipidemia, hyperglycemia, hypothyroidism who is referred by Dr. Rosales, cardiology for evaluation of varicose veins with edema.      Consult 24: Patient states that she has had longstanding varicose veins which do not specifically bother her.  However in the past couple of years, she has developed left > R lower extremity leg edema with pain and heaviness in the legs.  She has intermittent redness at the left ankle with warmth which usually occurs toward the end of the day.  The left leg has  "been swelling for a couple of years and now the right leg is slowly starting to swell.  Patient saw her cardiologist who told her her heart was fine and not cause of leg swelling.  We discussed that she should follow-up with PCP for metabolic and other potential contributing causes of swelling, but also suspect an element of venous insufficiency which we will start to treat conservatively.   She has chronic numbness in the toes.  Otherwise she reports for 88 she needs feels she has a lot of energy and is not held back.  She has no typical claudication.  She has no ischemic rest pain or tissue loss.     If she continues to have inadequately controlled edema, we can consider further compressive measures -  such as lymphedema pumps.  Baseline measurements were taken in the office today and she was fitted with Tubigrip.  We will reevaluate her in about 1 month.     Medical therapy includes aspirin 81, prednisone, etc.         5/31/24:  Ms. Méndez returns for vascular follow up. She brings in several pairs of compression stockings for us to look at. She appears to have OTC compression and wearing Dr. Stern. She reports that he OTC stockings are difficulty to get on but she is complaint and wears them. The legs feel partially, \"70%\" improved but she she has leg swelling and by the end of the day, the L shin is red. She has had no heat or infection, but with ongoing leg swelling and intermittent erythema, we discussed that should could be at risk for infection/ cellulitis.      She should trial medical compression stockings - Rx given. Also, discussed additonial conservative measures to held with edema.      Patient shows me photos of OTC below knee pneumatic devices available on [employer].  With ongoing symptoms and patient's request for additional treatment, recommend that she order medical compression. She is agreeable to daily treatment with compression pumps.      Follow up in about 3 months, but she is instructed to " return sooner for worsening symptoms or signs of infection.       8/30/24: Patient returns for vascular follow-up and to see how she is doing with her lymph pumps. However, she has not received the lymph pumps, but tells me that she does not answer her telephone.    The legs are overall stable.  She has mild leg edema at this point.  Very minimal pink discoloration at the left lower leg/ankle which she should watch as risk for recurrent doughy edema and infection. Currently, she has had no redness, drainage or infection.  She wears her OTC compression stockings.  She did purchase 2 pairs of medical compression stockings but unable to get them on.  She continue to complain of numbness in the right forefoot/toes. She was apparently told that this is related to her back disease/sciatica.  She does have a device in the back.  We reviewed    Conservative measures for treatment of leg swelling.  She does have a fair amount of varicose veins which we can continue to treat with compression.  She is not interested in surgery.    Review of Systems   Constitutional: Negative.    HENT: Negative.     Eyes: Negative.    Respiratory: Negative.     Cardiovascular:  Positive for leg swelling.   Gastrointestinal: Negative.    Endocrine: Negative.    Genitourinary: Negative.    Musculoskeletal: Negative.    Skin: Negative.    Allergic/Immunologic: Negative.    Neurological: Negative.    Hematological: Negative.    Psychiatric/Behavioral: Negative.         Objective     There were no vitals taken for this visit.    Physical Exam  Vitals and nursing note reviewed.   Constitutional:       Appearance: She is well-developed.   HENT:      Head: Normocephalic and atraumatic.   Eyes:      Pupils: Pupils are equal, round, and reactive to light.   Neck:      Thyroid: No thyromegaly.      Vascular: No JVD.      Trachea: Trachea normal.   Cardiovascular:      Rate and Rhythm: Normal rate and regular rhythm.      Pulses:           Radial pulses  "are 2+ on the right side and 2+ on the left side.        Dorsalis pedis pulses are detected w/ Doppler on the right side and detected w/ Doppler on the left side.        Posterior tibial pulses are detected w/ Doppler on the right side and detected w/ Doppler on the left side.      Heart sounds: Normal heart sounds, S1 normal and S2 normal. No murmur heard.     No friction rub. No gallop.      Comments:   Mild, non-pitting edema. Mild chronic pink discoloration at the L ankle. No infection.     Thighs with 2-3 mm varicosities  B L >R varicose veins up to 5-8 mm    Pulmonary:      Effort: Pulmonary effort is normal. No accessory muscle usage or respiratory distress.      Breath sounds: Normal breath sounds. No wheezing or rales.   Abdominal:      General: Bowel sounds are normal. There is no distension.      Palpations: Abdomen is soft.      Tenderness: There is no abdominal tenderness.   Musculoskeletal:         General: No deformity. Normal range of motion.      Cervical back: Neck supple.      Right lower leg: Edema present.      Left lower leg: Edema present.   Skin:     General: Skin is warm and dry.      Capillary Refill: Capillary refill takes less than 2 seconds.      Findings: No lesion or rash.      Nails: There is no clubbing.   Neurological:      Mental Status: She is alert and oriented to person, place, and time.      Comments: Grossly normal    Psychiatric:         Behavior: Behavior is cooperative.       I have reviewed and made appropriate changes to the review of systems input by the medical assistant.    Vitals:    08/30/24 0953   BP: 118/64   BP Location: Left arm   Patient Position: Sitting   Cuff Size: Standard   Pulse: 74   Weight: 76.2 kg (168 lb)   Height: 5' 4\" (1.626 m)       Patient Active Problem List   Diagnosis    Mild intermittent asthma without complication    Primary hypertension    Occipital neuralgia of left side    Venous insufficiency of both lower extremities    Secondary " lymphedema       Past Surgical History:   Procedure Laterality Date    CHOLECYSTECTOMY LAPAROSCOPIC      HYSTERECTOMY      LUMBAR SPINE SURGERY      luumbar spine surgery and fusion       Family History   Problem Relation Age of Onset    Diabetes Mother        Social History     Socioeconomic History    Marital status:      Spouse name: Not on file    Number of children: Not on file    Years of education: Not on file    Highest education level: Not on file   Occupational History    Not on file   Tobacco Use    Smoking status: Never    Smokeless tobacco: Never   Vaping Use    Vaping status: Unknown   Substance and Sexual Activity    Alcohol use: Not on file    Drug use: Not on file    Sexual activity: Not on file   Other Topics Concern    Not on file   Social History Narrative    Not on file     Social Determinants of Health     Financial Resource Strain: Not on file   Food Insecurity: Not on file   Transportation Needs: Not on file   Physical Activity: Not on file   Stress: Not on file   Social Connections: Not on file   Intimate Partner Violence: Not on file   Housing Stability: Not on file       Allergies   Allergen Reactions    Penicillins Edema    Alatrofloxacin Swelling    Iodine - Food Allergy Other (See Comments)    Iodixanol Other (See Comments)    Irbesartan Other (See Comments)     Heart palpitations and dyspnea    Other Other (See Comments)    Trovafloxacin Swelling     Reaction Date: 15May2008;         Current Outpatient Medications:     ascorbic Acid (VITAMIN C) 500 MG CPCR, Take 500 mg by mouth daily, Disp: , Rfl:     betamethasone, augmented, (DIPROLENE-AF) 0.05 % cream, Apply topically, Disp: , Rfl:     Cholecalciferol 25 MCG (1000 UT) capsule, Take 1,000 Units by mouth daily, Disp: , Rfl:     cycloSPORINE (Restasis) 0.05 % ophthalmic emulsion, Apply to eye, Disp: , Rfl:     ezetimibe (ZETIA) 10 mg tablet, Oral for 90 Days, Disp: , Rfl:     levothyroxine 50 mcg tablet, Oral for 90 Days, Disp:  , Rfl:     metoprolol succinate (TOPROL-XL) 50 mg 24 hr tablet, Oral for 90 Days, Disp: , Rfl:     mometasone (ELOCON) 0.1 % cream, Apply topically daily, Disp: , Rfl:     Multiple Vitamin (Daily Value Multivitamin) TABS, Take by mouth, Disp: , Rfl:     Multiple Vitamin (MULTIVITAMIN ADULT PO), Take 1 capsule by mouth daily, Disp: , Rfl:     nabumetone (RELAFEN) 500 mg tablet, Oral for 60 Days, Disp: , Rfl:     pantoprazole (PROTONIX) 40 mg tablet, Oral for 90 Days, Disp: , Rfl:     ramipril (ALTACE) 10 MG capsule, Oral for 90 Days, Disp: , Rfl:     Vitamin D, Cholecalciferol, 25 MCG (1000 UT) TABS, Take by mouth, Disp: , Rfl:     Vitamin E 400 units TABS, Take by mouth, Disp: , Rfl:     aspirin (Aspirin 81) 81 mg EC tablet, Take by mouth (Patient not taking: Reported on 4/12/2024), Disp: , Rfl:     fluticasone (FLONASE) 50 mcg/act nasal spray, Nasal for 30 Days (Patient not taking: Reported on 8/30/2024), Disp: , Rfl:     Fluticasone Furoate-Vilanterol 100-25 mcg/actuation inhaler, Inhale 1 puff daily (Patient not taking: Reported on 8/30/2024), Disp: , Rfl:     predniSONE 20 mg tablet, Oral for 7 Days (Patient not taking: Reported on 11/3/2023), Disp: , Rfl:

## 2024-08-30 NOTE — PATIENT INSTRUCTIONS
Varicose veins with leg swelling     -Add lymphedema pumps for 45 minutes twice daily and titrate (initial measurements 4/12/24, second 5/31/24)  -Continue with compression stockings to be worn daily and removed at night  -Compression, periodic elevation, low Na, skin moisturizer to legs daily  -Patient education regarding venous insufficiency  -Follow up 6 months or sooner, if increased leg pain, swelling or other concerns.

## 2025-05-15 ENCOUNTER — NURSE TRIAGE (OUTPATIENT)
Age: 89
End: 2025-05-15

## 2025-05-15 ENCOUNTER — TELEPHONE (OUTPATIENT)
Dept: VASCULAR SURGERY | Facility: CLINIC | Age: 89
End: 2025-05-15

## 2025-05-15 NOTE — TELEPHONE ENCOUNTER
Attempted to contact patient to schedule appointment(s) listed below.  Requested patient call [phone] to schedule appointment(s).    Patient's appointment(s) are past due, expected ASAP.    Dopplers  [] Abdominal Aorta Iliac (AOIL)  [] Carotid (CV)   [] Celiac and/or Mesenteric  [] Endovascular Aortic Repair (EVAR)   [] Hemodialysis Access (HD)   [] Lower Limb Arterial (TENA)  [] Lower Limb Venous (LEV)  [] Lower Limb Venous Duplex with Reflux (LEVDR)  [] Renal Artery  [] Upper Limb Arterial (UEA)    [] Upper Limb Venous (UEV)              [] SANDRA and Waveform analysis     Advanced Imaging   [] CTA head/neck    [] CTA abdomen    [] CTA abdomen & pelvis    [] CT abdomen with/ without contrast  [] CT abdomen with contrast  [] CT abdomen without contrast    [] CT abdomen & pelvis with/ without contrast  [] CT abdomen & pelvis with contrast  [] CT abdomen & pelvis without contrast    Office Visit   [] New patient, patient last seen over 3 years ago  [] Risk factor modification (RFM)   [x] Follow up L/S 8/30/24 RD - COMPRESSION STOCKING USAGE   [] Lost to follow up (LTFU)

## 2025-05-15 NOTE — TELEPHONE ENCOUNTER
"FOLLOW UP: pt scheduled for next available apt in [facility] w/ R Hussein 8/29/25.     REASON FOR CONVERSATION: Leg Swelling    SYMPTOMS: pt returned call from office to schedule f/u visit and was transf by PEP to triage symptoms.  Pt is c/o mild swelling L ankle and on and off pink/red discoloration around L ankle, sometimes warm to touch,  denies open wounds but is c/o severe itching and has tried neosporin ointment and A&D ointment, she is trying not to scratch    OTHER: pt wears compression stockings most days. She does not use lymphedema pumps as she used 3 x and after use would develop lump/swelling L lateral knee that made it difficult for her to ambulate or straighten her leg. She does not have my chart therefor unable to send photos.     DISPOSITION: See Within 2 Weeks in Office            Reason for Disposition   MILD swelling of one ankle is a chronic symptom (recurrent or ongoing AND present > 4 weeks)    Answer Assessment - Initial Assessment Questions  1. LOCATION: \"Which ankle is swollen?\" \"Where is the swelling?\"      L ankle  2. ONSET: \"When did the swelling start?\"      Ongoing, comes and goes  3. SWELLING: \"How bad is the swelling?\" Or, \"How large is it?\" (e.g., mild, moderate, severe; size of localized swelling)       mild  4. PAIN: \"Is there any pain?\" If Yes, ask: \"How bad is it?\" (Scale 0-10; or none, mild, moderate, severe)      none  5. CAUSE: \"What do you think caused the ankle swelling?\"      Hx lymphedema and venous insufficiency   6. OTHER SYMPTOMS: \"Do you have any other symptoms?\" (e.g., fever, chest pain, difficulty breathing, calf pain)      C/o pink/red discoloration and severe itching    Protocols used: Ankle Swelling-Adult-[place]    "

## 2025-05-15 NOTE — TELEPHONE ENCOUNTER
Called and s/w pt, relayed instructions per ABHINAV Marrero. Pt verbalized understanding. Offered pt sooner apts in the [place] location, pt does not want to travel and declined apts. Added to wait list.

## 2025-05-15 NOTE — TELEPHONE ENCOUNTER
Per review of available pictures in media, it appears intermittent L ankle swelling and redness has been present since at least 2024. Encourage pt to continue to use compression stockings regularly and elevate legs when sitting. Continue to monitor and if swelling/redness worsens, please contact the office to make a sooner OV. Otherwise, keep OV as scheduled.

## 2025-07-11 ENCOUNTER — TELEPHONE (OUTPATIENT)
Age: 89
End: 2025-07-11

## 2025-07-11 NOTE — TELEPHONE ENCOUNTER
Patient called returning a call , relayed the message of patient added to wailist, patient understood.

## 2025-07-11 NOTE — TELEPHONE ENCOUNTER
Patient called to see if she can be seen sooner, patient stated her legs are red and swollen and currently not using the compression pumps , I attempted to transfer the call to nursing but she refused , she only wants to talk to RD , I added patient to wait list in the [facility].

## 2025-07-11 NOTE — TELEPHONE ENCOUNTER
Called and LVM unfortunately no sooner apts in [place]/ Beronica Hussein PA-C. Pt added to wait list.

## 2025-07-22 ENCOUNTER — NURSE TRIAGE (OUTPATIENT)
Age: 89
End: 2025-07-22

## 2025-07-22 ENCOUNTER — APPOINTMENT (OUTPATIENT)
Dept: RADIOLOGY | Facility: CLINIC | Age: 89
End: 2025-07-22
Payer: MEDICARE

## 2025-07-22 ENCOUNTER — OFFICE VISIT (OUTPATIENT)
Dept: URGENT CARE | Facility: CLINIC | Age: 89
End: 2025-07-22
Payer: MEDICARE

## 2025-07-22 VITALS
HEIGHT: 64 IN | OXYGEN SATURATION: 100 % | DIASTOLIC BLOOD PRESSURE: 84 MMHG | BODY MASS INDEX: 28.68 KG/M2 | WEIGHT: 168 LBS | HEART RATE: 76 BPM | RESPIRATION RATE: 20 BRPM | SYSTOLIC BLOOD PRESSURE: 178 MMHG | TEMPERATURE: 98 F

## 2025-07-22 DIAGNOSIS — M25.462 PAIN AND SWELLING OF LEFT KNEE: Primary | ICD-10-CM

## 2025-07-22 DIAGNOSIS — M25.562 PAIN AND SWELLING OF LEFT KNEE: Primary | ICD-10-CM

## 2025-07-22 DIAGNOSIS — M25.462 PAIN AND SWELLING OF LEFT KNEE: ICD-10-CM

## 2025-07-22 DIAGNOSIS — M25.562 PAIN AND SWELLING OF LEFT KNEE: ICD-10-CM

## 2025-07-22 PROCEDURE — 73562 X-RAY EXAM OF KNEE 3: CPT

## 2025-07-22 PROCEDURE — 99203 OFFICE O/P NEW LOW 30 MIN: CPT

## 2025-07-22 NOTE — PROGRESS NOTES
[facility]  Name: Elvie Méndez      : 1935      MRN: [id number]  Encounter Provider: ABHINAV Topete  Encounter Date: 2025   Encounter department: [facility] [place]  :  Assessment & Plan  Pain and swelling of left knee  XR of left knee reviewed, degenerative changes noted, no acute osseous abnormality present.  Pending radiology review.  Recommend elevating lower extremities.  Apply ice to affected area.  Take Tylenol as needed for pain.  Referral to orthopedics for further workup and evaluation of left knee pain and swelling.  Orders:    XR knee 3 vw left non injury; Future    Ambulatory Referral to Orthopedic Surgery; Future        Patient Instructions  Follow up with PCP in 3-5 days.  Proceed to  ER if symptoms worsen.    If tests are performed, our office will contact you with results only if changes need to made to the care plan discussed with you at the visit. You can review your full results on [facility] MyChart.    Chief Complaint:   Chief Complaint   Patient presents with    Knee Swelling     Has joint catching on left leg cannot walk or get out of bed     History of Present Illness   89-year-old female presents for evaluation of left knee pain and swelling.  Patient states nearly 1 year ago she was wearing SCD pumps and developed pain and swelling in the outer portion of her left knee.  She notes that over the past 24 hours the pain and swelling has intensified.  She denies any current treatment for her symptoms.  She notes that the pain and swelling causes her to have difficulty ambulating.          Review of Systems   Musculoskeletal:  Positive for arthralgias and joint swelling.     Past Medical History   Past Medical History[1]  Past Surgical History[2]  Family History[3]  she reports that she has never smoked. She has never used smokeless tobacco.  Current Outpatient Medications   Medication Instructions    ascorbic Acid (VITAMIN C) 500 mg, Oral, Daily  "   aspirin (Aspirin 81) 81 mg EC tablet Take by mouth    betamethasone, augmented, (DIPROLENE-AF) 0.05 % cream Apply topically    Cholecalciferol 1,000 Units, Daily    cycloSPORINE (Restasis) 0.05 % ophthalmic emulsion Apply to eye    ezetimibe (ZETIA) 10 mg tablet     fluticasone (FLONASE) 50 mcg/act nasal spray Nasal for 30 Days    Fluticasone Furoate-Vilanterol 100-25 mcg/actuation inhaler 1 puff, Daily    levothyroxine 50 mcg tablet     metoprolol succinate (TOPROL-XL) 50 mg 24 hr tablet     mometasone (ELOCON) 0.1 % cream Daily    Multiple Vitamin (Daily Value Multivitamin) TABS Take by mouth    Multiple Vitamin (MULTIVITAMIN ADULT PO) 1 capsule, Daily    nabumetone (RELAFEN) 500 mg tablet Oral for 60 Days    pantoprazole (PROTONIX) 40 mg tablet     predniSONE 20 mg tablet Oral for 7 Days    ramipril (ALTACE) 10 MG capsule     Vitamin D, Cholecalciferol, 25 MCG (1000 UT) TABS Take by mouth    Vitamin E 400 units TABS Take by mouth   Allergies[4]     Objective   BP (!) 178/84   Pulse 76   Temp 98 °F (36.7 °C)   Resp 20   Ht 5' 4\" (1.626 m)   Wt 76.2 kg (168 lb)   SpO2 100%   BMI 28.84 kg/m²      Physical Exam  Vitals and nursing note reviewed.   Constitutional:       General: She is not in acute distress.     Appearance: Normal appearance. She is not ill-appearing, toxic-appearing or diaphoretic.   HENT:      Head: Normocephalic and atraumatic.     Eyes:      General:         Right eye: No discharge.         Left eye: No discharge.       Cardiovascular:      Rate and Rhythm: Normal rate.      Pulses: Normal pulses.   Pulmonary:      Effort: Pulmonary effort is normal.     Musculoskeletal:         General: Swelling and tenderness present. No signs of injury.        Legs:      Skin:     General: Skin is warm and dry.      Comments: Varicosities and superficial veins present lower extremity     Neurological:      Mental Status: She is alert.     Psychiatric:         Mood and Affect: Mood normal.         " "Behavior: Behavior normal.         Portions of the record may have been created with voice recognition software.  Occasional wrong word or \"sound a like\" substitutions may have occurred due to the inherent limitations of voice recognition software.  Read the chart carefully and recognize, using context, where substitutions have occurred.       [1]   Past Medical History:  Diagnosis Date    GERD (gastroesophageal reflux disease)     Hyperlipidemia     Hypertension     Hypothyroidism (acquired)    [2]   Past Surgical History:  Procedure Laterality Date    CHOLECYSTECTOMY LAPAROSCOPIC      HYSTERECTOMY      LUMBAR SPINE SURGERY      luumbar spine surgery and fusion   [3]   Family History  Problem Relation Name Age of Onset    Diabetes Mother     [4]   Allergies  Allergen Reactions    Penicillins Edema    Alatrofloxacin Swelling    Iodine - Food Allergy Other (See Comments)    Iodixanol Other (See Comments)    Irbesartan Other (See Comments)     Heart palpitations and dyspnea    Other Other (See Comments)    Trovafloxacin Swelling     Reaction Date: 15May2008;     "

## 2025-07-22 NOTE — PATIENT INSTRUCTIONS
Recommend elevating lower extremities.  Apply ice to affected area.  Take Tylenol as needed for pain.  Referral to orthopedics for further workup and evaluation of left knee pain and swelling.

## 2025-07-22 NOTE — TELEPHONE ENCOUNTER
"REASON FOR CONVERSATION: Knee Pain  Patient calling, states she has a \"bubble\" on the left side of her left knee cap. She states the pain has been off and on since May of last year when she removed her pneumatic compression pumps and developed pain in her knee. Pt states pain usually comes and goes away quickly, but today, she has had the pain and the bubble since 1130 am. It is painful to walk.     SYMPTOMS: left knee pain 5/10, \"bubble\" or fluid-filled area to left of knee cap, difficulty walking  Patient denies calf pain, redness, fever, rash, temperature/color changes.     OTHER HEALTH INFORMATION: Last OV 8/30/24    PROTOCOL DISPOSITION: See Today in Office    CARE ADVICE PROVIDED: Patient wants to be seen in Urgent care. Advised patient that knee pain sounds to possibly be musculoskeletal. She will call back if needed after seen by Urgent care.     PRACTICE FOLLOW-UP: Please review and advise if further recommendations.       Reason for Disposition   Very swollen knee joint    Answer Assessment - Initial Assessment Questions  1. LOCATION and RADIATION: \"Where is the pain located?\"       Left side of left knee cap  2. QUALITY: \"What does the pain feel like?\"  (e.g., sharp, dull, aching, burning)      Sharp, dull, aching and burning pain   3. SEVERITY: \"How bad is the pain?\" \"What does it keep you from doing?\"   (Scale 1-10; or mild, moderate, severe)      5/10  4. ONSET: \"When did the pain start?\" \"Does it come and go, or is it there all the time?\"      Last year, after taking off compression pumps  5. RECURRENT: \"Have you had this pain before?\" If Yes, ask: \"When, and what happened then?\"      yes  6. SETTING: \"Has there been any recent work, exercise or other activity that involved that part of the body?\"       denies  7. AGGRAVATING FACTORS: \"What makes the knee pain worse?\" (e.g., walking, climbing stairs, running)      walking  8. ASSOCIATED SYMPTOMS: \"Is there any swelling or redness of the knee?\"      " "Swelling  9. OTHER SYMPTOMS: \"Do you have any other symptoms?\" (e.g., chest pain, difficulty breathing, fever, calf pain)      denies    Protocols used: Knee Pain-Adult-[place]    "

## 2025-07-26 ENCOUNTER — HOSPITAL ENCOUNTER (EMERGENCY)
Facility: HOSPITAL | Age: 89
Discharge: HOME | End: 2025-07-26
Attending: EMERGENCY MEDICINE | Admitting: EMERGENCY MEDICINE
Payer: MEDICARE

## 2025-07-26 VITALS
OXYGEN SATURATION: 98 % | WEIGHT: 171.4 LBS | BODY MASS INDEX: 29.42 KG/M2 | DIASTOLIC BLOOD PRESSURE: 85 MMHG | TEMPERATURE: 97.9 F | SYSTOLIC BLOOD PRESSURE: 201 MMHG | RESPIRATION RATE: 18 BRPM | HEART RATE: 77 BPM

## 2025-07-26 DIAGNOSIS — L03.116 CELLULITIS OF LEFT LOWER EXTREMITY: Primary | ICD-10-CM

## 2025-07-26 PROCEDURE — 99284 EMERGENCY DEPT VISIT MOD MDM: CPT | Performed by: EMERGENCY MEDICINE

## 2025-07-26 PROCEDURE — 99283 EMERGENCY DEPT VISIT LOW MDM: CPT

## 2025-07-26 RX ORDER — CEPHALEXIN 500 MG/1
500 CAPSULE ORAL ONCE
Status: COMPLETED | OUTPATIENT
Start: 2025-07-26 | End: 2025-07-26

## 2025-07-26 RX ORDER — CEPHALEXIN 500 MG/1
500 CAPSULE ORAL EVERY 6 HOURS SCHEDULED
Qty: 28 CAPSULE | Refills: 0 | Status: SHIPPED | OUTPATIENT
Start: 2025-07-26 | End: 2025-08-02

## 2025-07-26 RX ADMIN — CEPHALEXIN 500 MG: 500 CAPSULE ORAL at 22:40

## 2025-08-01 ENCOUNTER — OFFICE VISIT (OUTPATIENT)
Dept: VASCULAR SURGERY | Facility: CLINIC | Age: 89
End: 2025-08-01
Payer: MEDICARE

## 2025-08-01 VITALS
RESPIRATION RATE: 16 BRPM | HEIGHT: 64 IN | OXYGEN SATURATION: 96 % | BODY MASS INDEX: 28.85 KG/M2 | SYSTOLIC BLOOD PRESSURE: 126 MMHG | WEIGHT: 169 LBS | HEART RATE: 66 BPM | DIASTOLIC BLOOD PRESSURE: 78 MMHG

## 2025-08-01 DIAGNOSIS — M79.605 PAIN OF LEFT LOWER EXTREMITY: ICD-10-CM

## 2025-08-01 DIAGNOSIS — I89.0 SECONDARY LYMPHEDEMA: ICD-10-CM

## 2025-08-01 DIAGNOSIS — I87.2 VENOUS INSUFFICIENCY OF BOTH LOWER EXTREMITIES: Primary | ICD-10-CM

## 2025-08-01 DIAGNOSIS — E11.42 DIABETIC POLYNEUROPATHY ASSOCIATED WITH TYPE 2 DIABETES MELLITUS (HCC): ICD-10-CM

## 2025-08-01 DIAGNOSIS — I10 PRIMARY HYPERTENSION: ICD-10-CM

## 2025-08-01 PROCEDURE — 99214 OFFICE O/P EST MOD 30 MIN: CPT | Performed by: PHYSICIAN ASSISTANT

## 2025-08-04 ENCOUNTER — TELEPHONE (OUTPATIENT)
Dept: VASCULAR SURGERY | Facility: CLINIC | Age: 89
End: 2025-08-04

## 2025-08-05 ENCOUNTER — HOSPITAL ENCOUNTER (OUTPATIENT)
Dept: RADIOLOGY | Facility: HOSPITAL | Age: 89
Discharge: HOME | End: 2025-08-05
Attending: PHYSICIAN ASSISTANT
Payer: MEDICARE

## 2025-08-05 DIAGNOSIS — M79.605 PAIN OF LEFT LOWER EXTREMITY: ICD-10-CM

## 2025-08-05 DIAGNOSIS — I87.2 VENOUS INSUFFICIENCY OF BOTH LOWER EXTREMITIES: ICD-10-CM

## 2025-08-05 PROCEDURE — 93971 EXTREMITY STUDY: CPT | Performed by: SURGERY

## 2025-08-05 PROCEDURE — 93971 EXTREMITY STUDY: CPT

## 2025-08-11 ENCOUNTER — HOSPITAL ENCOUNTER (OUTPATIENT)
Dept: RADIOLOGY | Facility: HOSPITAL | Age: 89
Discharge: HOME | End: 2025-08-11
Attending: PHYSICIAN ASSISTANT
Payer: MEDICARE